# Patient Record
Sex: FEMALE | Race: WHITE | NOT HISPANIC OR LATINO | URBAN - METROPOLITAN AREA
[De-identification: names, ages, dates, MRNs, and addresses within clinical notes are randomized per-mention and may not be internally consistent; named-entity substitution may affect disease eponyms.]

---

## 2024-06-22 ENCOUNTER — OFFICE VISIT (OUTPATIENT)
Dept: URGENT CARE | Facility: CLINIC | Age: 59
End: 2024-06-22
Payer: OTHER GOVERNMENT

## 2024-06-22 VITALS
SYSTOLIC BLOOD PRESSURE: 114 MMHG | DIASTOLIC BLOOD PRESSURE: 80 MMHG | WEIGHT: 160 LBS | RESPIRATION RATE: 16 BRPM | OXYGEN SATURATION: 97 % | HEIGHT: 66 IN | HEART RATE: 74 BPM | TEMPERATURE: 97.6 F | BODY MASS INDEX: 25.71 KG/M2

## 2024-06-22 DIAGNOSIS — R21 RASH: Primary | ICD-10-CM

## 2024-06-22 PROCEDURE — 99203 OFFICE O/P NEW LOW 30 MIN: CPT | Performed by: PHYSICIAN ASSISTANT

## 2024-06-22 PROCEDURE — 1036F TOBACCO NON-USER: CPT | Performed by: PHYSICIAN ASSISTANT

## 2024-06-22 RX ORDER — CLOTRIMAZOLE AND BETAMETHASONE DIPROPIONATE 10; .64 MG/G; MG/G
1 CREAM TOPICAL 2 TIMES DAILY
Qty: 15 G | Refills: 0 | Status: SHIPPED | OUTPATIENT
Start: 2024-06-22 | End: 2024-07-20

## 2024-06-22 ASSESSMENT — PAIN SCALES - GENERAL: PAINLEVEL: 4

## 2024-06-22 NOTE — PATIENT INSTRUCTIONS
Assessment/Plan   Problem List Items Addressed This Visit       Rash - Primary    Relevant Medications    clotrimazole-betamethasone (Lotrisone) cream      Patient with a annular rash on the abdomen and posterior trunk, also had presence of rash in the axilla bilaterally.  Some improvement with clobetasol yesterday but given the annular appearance we will include coverage for tinea I have sent clotrimazole betamethasone cream to the pharmacy for the patient and recommend follow-up with primary care

## 2024-06-22 NOTE — PROGRESS NOTES
"Subjective   Patient ID: Savanna Stevenson is a 58 y.o. female who presents for Rash (Skin irritation/rash on stomach & back, burning sensation, headache since yesterday).  Patient notes irritating itchy rash to the trunk since Thursday.  Notes that she had some similar rash to the underarms as well but this seemed to improve somewhat with clobetasol cream that she had leftover at home.  Rash seems to be spreading and patient notes that the rash is circular in shape and she is concerned about the possibility of ringworm.  Notes a headache yesterday but no fevers or chills myalgias      The remainder of the systems were reviewed and are negative unless noted above      Objective   /80   Pulse 74   Temp 36.4 °C (97.6 °F)   Resp 16   Ht 1.676 m (5' 6\")   Wt 72.6 kg (160 lb)   SpO2 97%   BMI 25.82 kg/m²   Physical Exam  Constitutional:       General: She is not in acute distress.     Appearance: Normal appearance. She is not ill-appearing, toxic-appearing or diaphoretic.   HENT:      Head: Normocephalic and atraumatic.      Mouth/Throat:      Mouth: Mucous membranes are moist.      Pharynx: Oropharynx is clear.   Eyes:      Conjunctiva/sclera: Conjunctivae normal.   Cardiovascular:      Rate and Rhythm: Normal rate and regular rhythm.      Heart sounds: No murmur heard.  Pulmonary:      Effort: Pulmonary effort is normal. No respiratory distress.      Breath sounds: Normal breath sounds. No wheezing.   Musculoskeletal:         General: No swelling, tenderness, deformity or signs of injury. Normal range of motion.      Cervical back: Normal range of motion and neck supple.   Skin:     General: Skin is warm and dry.      Findings: Erythema and rash present.      Comments: Mildly erythematous annular rash largely limited to the right sided thoracic back and abdomen.   Neurological:      General: No focal deficit present.      Mental Status: She is alert and oriented to person, place, and time.      Gait: Gait " normal.         Assessment/Plan   Problem List Items Addressed This Visit       Rash - Primary    Relevant Medications    clotrimazole-betamethasone (Lotrisone) cream      Patient with a annular rash on the abdomen and posterior trunk, also had presence of rash in the axilla bilaterally.  Some improvement with clobetasol yesterday but given the annular appearance we will include coverage for tinea I have sent clotrimazole betamethasone cream to the pharmacy for the patient and recommend follow-up with primary care